# Patient Record
Sex: FEMALE | Race: WHITE | NOT HISPANIC OR LATINO | URBAN - NONMETROPOLITAN AREA
[De-identification: names, ages, dates, MRNs, and addresses within clinical notes are randomized per-mention and may not be internally consistent; named-entity substitution may affect disease eponyms.]

---

## 2022-01-11 PROBLEM — I49.5 SICK SINUS SYNDROME (CMS/HCC): Chronic | Status: CHRONIC | Noted: 2022-01-11

## 2022-01-11 PROBLEM — I47.1 PAROXYSMAL SUPRAVENTRICULAR TACHYCARDIA (CMS/HCC): Chronic | Status: CHRONIC | Noted: 2022-01-11

## 2022-01-11 PROBLEM — R30.0 DYSURIA: Chronic | Status: CHRONIC | Noted: 2022-01-11

## 2022-01-14 PROBLEM — R42 VERTIGO: Status: RESOLVED | Noted: 2022-01-11

## 2022-01-14 PROBLEM — K29.71 GASTRITIS WITH HEMORRHAGE: Status: RESOLVED | Noted: 2022-01-11

## 2022-01-14 PROBLEM — I10 SYSTEMIC PRIMARY ARTERIAL HYPERTENSION: Status: RESOLVED | Noted: 2022-01-11

## 2022-01-14 PROBLEM — G45.9 TIA (TRANSIENT ISCHEMIC ATTACK): Chronic | Status: CHRONIC | Noted: 2022-01-14

## 2022-01-14 PROBLEM — M54.12 RADICULOPATHY, CERVICAL: Status: RESOLVED | Noted: 2022-01-11

## 2022-01-14 PROBLEM — I48.0 PAROXYSMAL ATRIAL FIBRILLATION (CMS/HCC): Chronic | Status: CHRONIC | Noted: 2022-01-14

## 2022-01-14 PROBLEM — I10 HYPERTENSION: Chronic | Status: CHRONIC | Noted: 2022-01-14

## 2022-01-14 PROBLEM — M35.00: Status: RESOLVED | Noted: 2022-01-11

## 2022-01-14 PROBLEM — Z95.0 PACEMAKER: Chronic | Status: CHRONIC | Noted: 2022-01-14

## 2022-01-14 PROBLEM — F32.A DEPRESSION: Status: RESOLVED | Noted: 2022-01-11

## 2022-01-14 PROBLEM — R06.00 DYSPNEA AND RESPIRATORY ABNORMALITIES: Status: RESOLVED | Noted: 2022-01-11

## 2022-01-14 PROBLEM — Z87.39: Status: RESOLVED | Noted: 2022-01-11

## 2022-01-14 PROBLEM — Z79.01 ANTICOAGULATED: Chronic | Status: CHRONIC | Noted: 2022-01-14

## 2022-01-14 PROBLEM — M32.9 SYSTEMIC LUPUS ERYTHEMATOSUS (CMS/HCC): Status: RESOLVED | Noted: 2022-01-11

## 2022-01-14 PROBLEM — I49.5 TACHY-BRADY SYNDROME (CMS/HCC): Chronic | Status: CHRONIC | Noted: 2022-01-14

## 2022-01-14 PROBLEM — K63.5 COLON POLYP: Status: RESOLVED | Noted: 2022-01-11

## 2022-01-14 PROBLEM — R73.9 HYPERGLYCEMIA: Status: RESOLVED | Noted: 2022-01-11

## 2022-12-01 PROBLEM — M35.00 SJOGREN SYNDROME, UNSPECIFIED (CMS/HCC): Chronic | Status: CHRONIC | Noted: 2022-12-01

## 2022-12-01 PROBLEM — G47.30 SLEEP APNEA: Chronic | Status: CHRONIC | Noted: 2022-12-01

## 2022-12-01 PROBLEM — R13.10 DYSPHAGIA: Chronic | Status: CHRONIC | Noted: 2022-12-01

## 2022-12-01 PROBLEM — K21.9 GASTROESOPHAGEAL REFLUX DISEASE: Chronic | Status: CHRONIC | Noted: 2022-12-01

## 2022-12-01 PROBLEM — I21.4 NON-ST ELEVATION (NSTEMI) MYOCARDIAL INFARCTION (CMS/HCC): Chronic | Status: CHRONIC | Noted: 2022-12-01

## 2022-12-01 PROBLEM — F32.A DEPRESSIVE DISORDER: Chronic | Status: CHRONIC | Noted: 2022-12-01

## 2022-12-01 PROBLEM — M54.50 CHRONIC LOW BACK PAIN: Chronic | Status: CHRONIC | Noted: 2022-12-01

## 2022-12-01 PROBLEM — K58.9 IRRITABLE BOWEL SYNDROME: Chronic | Status: CHRONIC | Noted: 2022-12-01

## 2022-12-01 PROBLEM — R53.82 CHRONIC FATIGUE, UNSPECIFIED: Chronic | Status: CHRONIC | Noted: 2022-12-01

## 2022-12-01 PROBLEM — F41.9 ANXIETY: Chronic | Status: CHRONIC | Noted: 2022-12-01

## 2022-12-01 PROBLEM — M35.00 SJOGREN'S SYNDROME (CMS/HCC): Chronic | Status: CHRONIC | Noted: 2022-12-01

## 2022-12-01 PROBLEM — R06.09 DYSPNEA ON EXERTION: Chronic | Status: CHRONIC | Noted: 2022-12-01

## 2022-12-01 PROBLEM — K80.20 CHOLELITHIASIS: Chronic | Status: CHRONIC | Noted: 2022-12-01

## 2022-12-01 PROBLEM — Z79.02 LONG TERM (CURRENT) USE OF ANTITHROMBOTICS/ANTIPLATELETS: Chronic | Status: CHRONIC | Noted: 2022-12-01

## 2022-12-01 PROBLEM — M06.9 RHEUMATOID ARTHRITIS (CMS/HCC): Chronic | Status: CHRONIC | Noted: 2022-12-01

## 2022-12-01 PROBLEM — D84.9 IMMUNOSUPPRESSION (CMS/HCC): Chronic | Status: CHRONIC | Noted: 2022-12-01

## 2022-12-01 PROBLEM — K80.20 GALL STONES: Chronic | Status: CHRONIC | Noted: 2022-12-01

## 2022-12-01 PROBLEM — K44.9 DIAPHRAGMATIC HERNIA WITHOUT OBSTRUCTION OR GANGRENE: Chronic | Status: CHRONIC | Noted: 2022-12-01

## 2022-12-01 PROBLEM — Z86.69 HISTORY OF MIGRAINE: Chronic | Status: CHRONIC | Noted: 2022-12-01

## 2022-12-01 PROBLEM — Z86.79 HISTORY OF ATRIAL FIBRILLATION: Chronic | Status: CHRONIC | Noted: 2022-12-01

## 2022-12-01 PROBLEM — R10.13 EPIGASTRIC PAIN: Chronic | Status: CHRONIC | Noted: 2022-12-01

## 2022-12-01 PROBLEM — Z95.0 PRESENCE OF CARDIAC PACEMAKER: Chronic | Status: CHRONIC | Noted: 2022-12-01

## 2022-12-01 PROBLEM — E55.9 VITAMIN D DEFICIENCY: Chronic | Status: CHRONIC | Noted: 2022-12-01

## 2022-12-01 PROBLEM — M51.26 HERNIATED LUMBAR INTERVERTEBRAL DISC: Chronic | Status: CHRONIC | Noted: 2022-12-01

## 2022-12-01 PROBLEM — M06.9 RHEUMATOID ARTHRITIS, UNSPECIFIED (CMS/HCC): Chronic | Status: CHRONIC | Noted: 2022-12-01

## 2022-12-01 PROBLEM — F32.9 MAJOR DEPRESSIVE DISORDER, SINGLE EPISODE, UNSPECIFIED: Chronic | Status: CHRONIC | Noted: 2022-12-01

## 2022-12-01 PROBLEM — E78.5 HYPERLIPIDEMIA: Chronic | Status: CHRONIC | Noted: 2022-12-01

## 2022-12-01 PROBLEM — R06.83 SNORING: Chronic | Status: CHRONIC | Noted: 2022-12-01

## 2022-12-01 PROBLEM — G45.9 TRANSIENT ISCHEMIC ATTACK: Chronic | Status: CHRONIC | Noted: 2022-12-01

## 2023-08-03 ENCOUNTER — OFFICE (OUTPATIENT)
Dept: URBAN - NONMETROPOLITAN AREA CLINIC 1 | Facility: CLINIC | Age: 78
End: 2023-08-03
Payer: COMMERCIAL

## 2023-08-03 VITALS
HEART RATE: 64 BPM | DIASTOLIC BLOOD PRESSURE: 84 MMHG | SYSTOLIC BLOOD PRESSURE: 144 MMHG | SYSTOLIC BLOOD PRESSURE: 142 MMHG | WEIGHT: 150 LBS

## 2023-08-03 DIAGNOSIS — K31.84 GASTROPARESIS: ICD-10-CM

## 2023-08-03 DIAGNOSIS — K21.9 GASTRO-ESOPHAGEAL REFLUX DISEASE WITHOUT ESOPHAGITIS: ICD-10-CM

## 2023-08-03 PROCEDURE — 99213 OFFICE O/P EST LOW 20 MIN: CPT | Performed by: NURSE PRACTITIONER

## 2023-08-03 RX ORDER — METOCLOPRAMIDE HYDROCHLORIDE 10 MG/1
10 TABLET ORAL
Qty: 30 | Refills: 3 | Status: ACTIVE
Start: 2023-08-03

## 2023-08-03 NOTE — SERVICEHPINOTES
In today for follow-up of gastroparesis.  She takes pantoprazole 40 mg in the morning, and famotidine at night.  She has a lot of acid reflux, especially first thing in the morning after being horizontal all night.  She uses Reglan sparingly, and I suggested she take it daily or every other day to see if it gives her any relief.  Her chronic illnesses include gastroparesis, osteoporosis, lupus, IBS, hyperlipidemia, hypertension, coronary artery disease.